# Patient Record
Sex: MALE | Race: WHITE | NOT HISPANIC OR LATINO | URBAN - METROPOLITAN AREA
[De-identification: names, ages, dates, MRNs, and addresses within clinical notes are randomized per-mention and may not be internally consistent; named-entity substitution may affect disease eponyms.]

---

## 2017-06-09 ENCOUNTER — EMERGENCY (EMERGENCY)
Facility: HOSPITAL | Age: 27
LOS: 0 days | Discharge: HOME | End: 2017-06-09
Attending: EMERGENCY MEDICINE | Admitting: EMERGENCY MEDICINE
Payer: OTHER MISCELLANEOUS

## 2017-06-09 VITALS — HEIGHT: 73 IN | WEIGHT: 160.06 LBS

## 2017-06-09 VITALS
SYSTOLIC BLOOD PRESSURE: 124 MMHG | HEART RATE: 84 BPM | TEMPERATURE: 98 F | OXYGEN SATURATION: 100 % | RESPIRATION RATE: 19 BRPM | DIASTOLIC BLOOD PRESSURE: 83 MMHG

## 2017-06-09 DIAGNOSIS — S60.141A CONTUSION OF RIGHT RING FINGER WITH DAMAGE TO NAIL, INITIAL ENCOUNTER: ICD-10-CM
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
## 2017-06-09 DIAGNOSIS — S69.81XA OTHER SPECIFIED INJURIES OF RIGHT WRIST, HAND AND FINGER(S), INITIAL ENCOUNTER: ICD-10-CM
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
## 2017-06-09 DIAGNOSIS — S67.194A CRUSHING INJURY OF RIGHT RING FINGER, INITIAL ENCOUNTER: ICD-10-CM

## 2017-06-09 PROCEDURE — 73140 X-RAY EXAM OF FINGER(S): CPT | Mod: 26,RT

## 2017-06-09 PROCEDURE — 11740 EVACUATION SUBUNGUAL HMTMA: CPT | Mod: F8

## 2017-06-09 PROCEDURE — 99284 EMERGENCY DEPT VISIT MOD MDM: CPT

## 2017-06-09 RX ORDER — CEPHALEXIN 500 MG
500 CAPSULE ORAL ONCE
Qty: 0 | Refills: 0 | Status: COMPLETED | OUTPATIENT
Start: 2017-06-09 | End: 2017-06-09

## 2017-06-09 RX ORDER — OXYCODONE HYDROCHLORIDE 5 MG/1
1 TABLET ORAL
Qty: 4 | Refills: 0 | OUTPATIENT
Start: 2017-06-09 | End: 2017-06-10

## 2017-06-09 RX ORDER — CEPHALEXIN 500 MG
1 CAPSULE ORAL
Qty: 40 | Refills: 0 | OUTPATIENT
Start: 2017-06-09 | End: 2017-06-19

## 2017-06-09 RX ADMIN — Medication 500 MILLIGRAM(S): at 10:10

## 2017-06-09 NOTE — ED PROCEDURE NOTE - CPROC ED POST PROC CARE GUIDE1
Instructed patient/caregiver regarding signs and symptoms of infection./Verbal/written post procedure instructions were given to patient/caregiver./Keep the cast/splint/dressing clean and dry./Instructed patient/caregiver to follow-up with primary care physician.
Verbal/written post procedure instructions were given to patient/caregiver./Instructed patient/caregiver regarding signs and symptoms of infection./Keep the cast/splint/dressing clean and dry./Instructed patient/caregiver to follow-up with primary care physician.

## 2017-06-09 NOTE — ED PROVIDER NOTE - ATTENDING CONTRIBUTION TO CARE
I, Mariza Desouza, performed the initial face to face bedside interview with this patient regarding history of present illness, review of symptoms and relevant past medical, social and family history.  I completed an independent physical examination.  I was the initial provider who evaluated this patient. The history, relevant review of systems, past medical and surgical history, medical decision making, and physical examination was documented by the scribe in my presence and I attest to the accuracy of the documentation. I, Mariza Desouza, performed the initial face to face bedside interview with this patient regarding history of present illness, review of symptoms and relevant past medical, social and family history.  I completed an independent physical examination.  I was the initial provider who evaluated this patient. The history, relevant review of systems, past medical and surgical history, medical decision making. I agree with acp plan

## 2017-06-09 NOTE — ED PROVIDER NOTE - OBJECTIVE STATEMENT
Pt. is a 26 year old male presenting with right fourth finger laceration.  Pt. states crush injury at work.  Pt. states sharp pain to finger.    Tdap UTD. Pt. is a 26 year old male presenting with right fourth finger laceration.  Pt. states crush injury at work.  Pt. states sharp pain to finger.    Injury occurred at work.    Tdap UTD.

## 2017-06-09 NOTE — ED PROVIDER NOTE - MUSCULOSKELETAL, MLM
partial nail avulsion right fourth finger at base with subungual present. partial nail avulsion right fourth finger at base medially with subungual present.

## 2017-06-09 NOTE — ED ADULT TRIAGE NOTE - CHIEF COMPLAINT QUOTE
c/o right 4th finger injury while at work, laceration to inferior portion of nail bed, bleeding controlled.

## 2017-06-09 NOTE — ED PROVIDER NOTE - PROGRESS NOTE DETAILS
Neg. Fx on on xray.  I spoke with Dr. Villasenor and he requested nail trephination and ABX.  FU office next week.  Liv Robison PA-C Nail trephinated with electric cautery.  Liv Robison PA-C

## 2017-06-09 NOTE — ED PROCEDURE NOTE - NS ED PERI VASCULAR NEG
no cyanosis of extremity/capillary refill time < 2 seconds/no swelling/fingers/toes warm to touch/no paresthesia

## 2017-06-09 NOTE — ED PROVIDER NOTE - MEDICAL DECISION MAKING DETAILS
pt with right 4th finger laceration involving the  nail bed, from crush  inury at work, sensation intact, from, teatnus, xray, agree with pa evaluation, treatemnt and dispotiion

## 2018-10-28 ENCOUNTER — EMERGENCY (EMERGENCY)
Facility: HOSPITAL | Age: 28
LOS: 1 days | Discharge: HOME | End: 2018-10-28
Attending: EMERGENCY MEDICINE
Payer: COMMERCIAL

## 2018-10-28 ENCOUNTER — EMERGENCY (EMERGENCY)
Facility: HOSPITAL | Age: 28
LOS: 0 days | Discharge: HOME | End: 2018-10-28
Attending: EMERGENCY MEDICINE | Admitting: EMERGENCY MEDICINE
Payer: COMMERCIAL

## 2018-10-28 VITALS
HEIGHT: 73 IN | HEART RATE: 104 BPM | OXYGEN SATURATION: 96 % | SYSTOLIC BLOOD PRESSURE: 124 MMHG | WEIGHT: 154.98 LBS | TEMPERATURE: 98 F | DIASTOLIC BLOOD PRESSURE: 86 MMHG | RESPIRATION RATE: 18 BRPM

## 2018-10-28 VITALS
TEMPERATURE: 98 F | SYSTOLIC BLOOD PRESSURE: 124 MMHG | OXYGEN SATURATION: 99 % | DIASTOLIC BLOOD PRESSURE: 85 MMHG | HEART RATE: 99 BPM | RESPIRATION RATE: 18 BRPM

## 2018-10-28 VITALS — WEIGHT: 169.98 LBS | HEIGHT: 73 IN

## 2018-10-28 DIAGNOSIS — H53.9 UNSPECIFIED VISUAL DISTURBANCE: ICD-10-CM

## 2018-10-28 DIAGNOSIS — H10.9 UNSPECIFIED CONJUNCTIVITIS: ICD-10-CM

## 2018-10-28 DIAGNOSIS — H57.89 OTHER SPECIFIED DISORDERS OF EYE AND ADNEXA: ICD-10-CM

## 2018-10-28 PROCEDURE — 99282 EMERGENCY DEPT VISIT SF MDM: CPT

## 2018-10-28 PROCEDURE — 99284 EMERGENCY DEPT VISIT MOD MDM: CPT

## 2018-10-28 RX ORDER — CIPROFLOXACIN HCL 0.3 %
1 DROPS OPHTHALMIC (EYE) ONCE
Qty: 0 | Refills: 0 | Status: COMPLETED | OUTPATIENT
Start: 2018-10-28 | End: 2018-10-28

## 2018-10-28 RX ADMIN — Medication 1 DROP(S): at 08:33

## 2018-10-28 RX ADMIN — Medication 1 TABLET(S): at 08:34

## 2018-10-28 NOTE — ED ADULT NURSE NOTE - OBJECTIVE STATEMENT
Pt reports noticing some discomfort behind the eye two days ago with increasing redness over past day. Pt reports that he hasn't noticed purulent drainage from eye, but that his eye was tearing more than usual. Pt denies any known injury or foreign body, but reports that he is an  and often is exposed to dust and particles. Pt reports that right eye has some cloudiness to his vision that is not normal.  Denies pain at this time with redness noted.

## 2018-10-28 NOTE — ED ADULT TRIAGE NOTE - CCCP TRG CHIEF CMPLNT
"hazy"/blurry vision rt eye s/p pink eye/seen at [place] today for same/sent here for further evaluation

## 2018-10-28 NOTE — ED ADULT TRIAGE NOTE - CHIEF COMPLAINT QUOTE
"hazy"/blurry vision rt eye s/p pink eye/seen at [place] today for same/sent here for further evaluation

## 2018-10-28 NOTE — ED ADULT NURSE NOTE - NSIMPLEMENTINTERV_GEN_ALL_ED
Implemented All Universal Safety Interventions:  [place] to call system. Call bell, personal items and telephone within reach. Instruct patient to call for assistance. Room bathroom lighting operational. Non-slip footwear when patient is off stretcher. Physically safe environment: no spills, clutter or unnecessary equipment. Stretcher in lowest position, wheels locked, appropriate side rails in place.

## 2018-10-28 NOTE — ED PROVIDER NOTE - MEDICAL DECISION MAKING DETAILS
Patient offered in ED work up and transfer for ophtho eval (as no ophtho available in house), and instead opts to leave on own, understanding the urgency of seeing an ophtho physician as soon as possible. Instructed to return if unable to go to [facility] or [place] to see eye doctor immediately or for any health concerns.

## 2018-10-28 NOTE — ED PROVIDER NOTE - MUSCULOSKELETAL, MLM
Spine appears normal, range of motion is not limited, no muscle or joint tenderness. 5/5 strength on flexion and extension of all limbs. No nuchal rigidity. No saddle anesthesia.

## 2018-10-28 NOTE — ED ADULT NURSE NOTE - OBJECTIVE STATEMENT
27 yo male complaining of redness and blurryness in rt eye. pt states he thinks he has pink eye starting yesterday, and this morning his right eye is blurry and foggy.  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  PT is AOx3 PERRL 3mm. No extra ocular eye mvmts no nystagmus.  PT was seen at [facility] and told to come here for an optometrist.  No PMH no cardiac or pulmonary issues 29 yo male complaining of redness and blurriness in rt eye. pt states he thinks he has pink eye starting yesterday, and this morning his right eye is blurry and foggy.  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  PT is AOx3 PERRL 3mm pt denies fever and chills.  no nystagmus.  PT was seen at [facility] and told to come here for an optometrist.  No PMH no cardiac or pulmonary issues.

## 2018-10-28 NOTE — ED ADULT NURSE NOTE - NSIMPLEMENTINTERV_GEN_ALL_ED
Implemented All Universal Safety Interventions:  [place] to call system. Call bell, personal items and telephone within reach. Instruct patient to call for assistance. Room bathroom lighting operational. Non-slip footwear when patient is off stretcher. Physically safe environment: no spills, clutter or unnecessary equipment. Stretcher in lowest position, wheels locked, appropriate side rails in place.

## 2018-10-28 NOTE — ED PROVIDER NOTE - CARE PLAN
Principal Discharge DX:	Conjunctivitis, unspecified conjunctivitis type, unspecified laterality  Secondary Diagnosis:	Decreased visual acuity

## 2018-10-28 NOTE — ED PROVIDER NOTE - PROGRESS NOTE DETAILS
Patient noted to be 20/100 in the Rt eye vs. 20/20 in the left eye. Patient offered CT, labs, and work up here with transfer to [facility] or [place] but opts to drive with family since driving would be faster. Patient given abx by mouth, eye drops and instructions to return to us immediately if unable to go to [place] or [facility] for ortho evaluation. Patient's family will drive patient. She can see clearly and is happy to drive. Patient is amicable for dc from this ER and follow up with Optho. Patient noted to be 20/100 in the Rt eye vs. 20/20 in the left eye. Patient offered CT, labs, and work up here with transfer to [facility] or [place] for ophto eval but opts to drive with family since driving would be faster. Patient given abx by mouth, eye drops and instructions to return to us immediately if unable to go to [place] or [facility] for ophtho evaluation. Patient's family will drive patient. She can see clearly and is happy and safe to drive patient and self for ophtho eval. Patient is amicable for dc from this ER and follow up with Optho.

## 2018-10-28 NOTE — ED PROVIDER NOTE - PHYSICAL EXAMINATION
Attending note. Patient is alert and in no acute distress. Leads, lashes and lachrymal his are unremarkable. There sclera and conjunctiva with injection of the right side. There is no hyphema. Fluorescein dye of the cord is negative for abrasion. Anterior chambers deep and quite. Pupils 3 mm equal reactive. Fundus is normal. Extraocular muscles are intact. Visual fields are intact on confrontation. Visual acuity is 20/20 OD and 20/20 OS.

## 2018-10-28 NOTE — ED PROVIDER NOTE - OBJECTIVE STATEMENT
Attending note. Patient was seen in the fast track eye room.  Patient complaining of right eye redness and pain which was severe last night. Symptoms started Thursday evening. Patient is an  and was working overhead. Patient states just fell into his right eye Thursday evening. Patient complains of blurred vision today and was seen at [facility] ER, and then referred to the ER at [place] to possibly see ophthalmology. Patient has no previous eye history. Patient wears no corrective lenses.  Patient denies any pain in his right eye today. Patient denies any discharge. He has no photophobia.

## 2018-10-28 NOTE — ED ADULT NURSE NOTE - CCCP TRG CHIEF CMPLNT
"hazy"/blurry vision rt eye s/p pink eye/seen at [place] today for same/sent here for further evaluation

## 2018-10-28 NOTE — ED PROVIDER NOTE - MEDICAL DECISION MAKING DETAILS
Attending note-conjunctivitis of the right eye. Possibly due to foreign body or chemical and less likely bacteria. Patient was provided with a bottle of ciprofloxacin eyedrops from [facility]. Patient will continue using those 4 times a day. Patient advised to followup with [facility] as worsening vision or recurrent pain. Ophthalmology is not open, he is advised to return to the ER.

## 2018-10-28 NOTE — ED PROVIDER NOTE - OBJECTIVE STATEMENT
28 year old male with no significant PMH presents with cc of Rt eye pain, discharge, redness, URI like symptoms, and now today decreased vision in the Rt eye that he states started with the symptoms. No cp, sob or palpitation. No IVDU. No ETOH abuse. No melena or hematochezia. No fever or chills. 28 year old male with no significant PMH presents with cc of Rt eye pain, discharge, redness, URI like symptoms, and now today decreased vision in the Rt eye that he states started with the symptoms. No cp, sob or palpitation. No IVDU. No ETOH abuse. No melena or hematochezia. No fever or chills. No pain on gaze/EOM. No neck stiffness. No dysuria hematuria or frequency. No incontinence of saddle anesthesia. No HA. No rash. No recent trauma.

## 2018-10-28 NOTE — ED ADULT NURSE NOTE - CHIEF COMPLAINT QUOTE
"hazy"/blurry vision rt eye s/p pink eye/seen at [place] today for same/sent here for further evaluation

## 2018-10-28 NOTE — ED PROVIDER NOTE - NEUROLOGICAL, MLM
Alert and oriented, no focal deficits, no motor or sensory deficits. CNs 2-12 grossly intact. Vision is 20/100 in right eye.

## 2019-04-11 NOTE — ED PROVIDER NOTE - CHIEF COMPLAINT
The patient is a 28y Male complaining of eye discharge. [FreeTextEntry1] : Abdomen: soft, incisions healing well.

## 2022-01-07 PROBLEM — Z00.00 ENCOUNTER FOR PREVENTIVE HEALTH EXAMINATION: Status: ACTIVE | Noted: 2022-01-07
